# Patient Record
Sex: MALE | Race: WHITE | Employment: FULL TIME | URBAN - METROPOLITAN AREA
[De-identification: names, ages, dates, MRNs, and addresses within clinical notes are randomized per-mention and may not be internally consistent; named-entity substitution may affect disease eponyms.]

---

## 2017-03-28 ENCOUNTER — OFFICE VISIT (OUTPATIENT)
Dept: FAMILY MEDICINE CLINIC | Age: 47
End: 2017-03-28

## 2017-03-28 VITALS
DIASTOLIC BLOOD PRESSURE: 68 MMHG | WEIGHT: 154 LBS | HEIGHT: 69 IN | TEMPERATURE: 98.2 F | HEART RATE: 64 BPM | SYSTOLIC BLOOD PRESSURE: 140 MMHG | BODY MASS INDEX: 22.81 KG/M2 | RESPIRATION RATE: 16 BRPM

## 2017-03-28 DIAGNOSIS — Z13.220 SCREENING, LIPID: ICD-10-CM

## 2017-03-28 DIAGNOSIS — Z13.1 SCREENING FOR DIABETES MELLITUS: ICD-10-CM

## 2017-03-28 DIAGNOSIS — Z00.00 WELL ADULT EXAM: Primary | ICD-10-CM

## 2017-03-28 PROCEDURE — 99386 PREV VISIT NEW AGE 40-64: CPT | Performed by: FAMILY MEDICINE

## 2017-03-28 ASSESSMENT — ENCOUNTER SYMPTOMS
DIARRHEA: 0
CONSTIPATION: 0
CHEST TIGHTNESS: 0
SHORTNESS OF BREATH: 0
NAUSEA: 0
BLOOD IN STOOL: 0
ABDOMINAL PAIN: 0
VOMITING: 0
ANAL BLEEDING: 0

## 2017-03-30 LAB
ALBUMIN SERPL-MCNC: 4.7 G/DL (ref 3.2–5.3)
ALK PHOSPHATASE: 45 IU/L (ref 35–121)
ALT SERPL-CCNC: 16 IU/L (ref 5–59)
ANION GAP SERPL CALCULATED.3IONS-SCNC: 6 MMOL/L
AST SERPL-CCNC: 21 IU/L (ref 10–42)
BILIRUB SERPL-MCNC: 0.7 MG/DL (ref 0.2–1.3)
BUN BLDV-MCNC: 15 MG/DL (ref 10–20)
CALCIUM SERPL-MCNC: 9.5 MG/DL (ref 8.7–10.8)
CHLORIDE BLD-SCNC: 106 MMOL/L (ref 95–111)
CHOLESTEROL/HDL RATIO: 2.1
CHOLESTEROL: 115 MG/DL
CO2: 30 MMOL/L (ref 21–32)
CREAT SERPL-MCNC: 0.8 MG/DL (ref 0.5–1.3)
EGFR AFRICAN AMERICAN: 126
EGFR IF NONAFRICAN AMERICAN: 105
GLUCOSE: 86 MG/DL (ref 70–100)
HDLC SERPL-MCNC: 55 MG/DL (ref 40–60)
LDL CHOLESTEROL CALCULATED: 52 MG/DL
LDL/HDL RATIO: 0.9
POTASSIUM SERPL-SCNC: 4.2 MMOL/L (ref 3.5–5.4)
SODIUM BLD-SCNC: 138 MMOL/L (ref 134–147)
TOTAL PROTEIN: 7.1 G/DL (ref 5.8–8)
TRIGL SERPL-MCNC: 40 MG/DL
VLDLC SERPL CALC-MCNC: 8 MG/DL

## 2018-04-02 ENCOUNTER — TELEPHONE (OUTPATIENT)
Dept: FAMILY MEDICINE CLINIC | Age: 48
End: 2018-04-02

## 2018-04-03 ENCOUNTER — TELEPHONE (OUTPATIENT)
Dept: FAMILY MEDICINE CLINIC | Age: 48
End: 2018-04-03

## 2018-05-14 ENCOUNTER — OFFICE VISIT (OUTPATIENT)
Dept: FAMILY MEDICINE CLINIC | Age: 48
End: 2018-05-14
Payer: COMMERCIAL

## 2018-05-14 VITALS
HEIGHT: 69 IN | SYSTOLIC BLOOD PRESSURE: 136 MMHG | WEIGHT: 155.4 LBS | BODY MASS INDEX: 23.02 KG/M2 | HEART RATE: 64 BPM | DIASTOLIC BLOOD PRESSURE: 90 MMHG | TEMPERATURE: 98.6 F | RESPIRATION RATE: 12 BRPM

## 2018-05-14 DIAGNOSIS — Z00.00 LABORATORY EXAM ORDERED AS PART OF ROUTINE GENERAL MEDICAL EXAMINATION: ICD-10-CM

## 2018-05-14 DIAGNOSIS — Z00.00 WELL ADULT EXAM: Primary | ICD-10-CM

## 2018-05-14 DIAGNOSIS — L30.9 ECZEMA, UNSPECIFIED TYPE: ICD-10-CM

## 2018-05-14 DIAGNOSIS — I10 ESSENTIAL HYPERTENSION: ICD-10-CM

## 2018-05-14 DIAGNOSIS — J30.1 CHRONIC SEASONAL ALLERGIC RHINITIS DUE TO POLLEN: ICD-10-CM

## 2018-05-14 PROCEDURE — 99396 PREV VISIT EST AGE 40-64: CPT | Performed by: FAMILY MEDICINE

## 2018-05-14 RX ORDER — LORATADINE 10 MG/1
10 TABLET ORAL DAILY
COMMUNITY

## 2018-05-14 RX ORDER — LISINOPRIL 10 MG/1
10 TABLET ORAL DAILY
Qty: 30 TABLET | Refills: 1 | Status: SHIPPED | OUTPATIENT
Start: 2018-05-14 | End: 2018-06-08 | Stop reason: SDUPTHER

## 2018-05-14 ASSESSMENT — ENCOUNTER SYMPTOMS
VOMITING: 0
CONSTIPATION: 0
CHEST TIGHTNESS: 0
ANAL BLEEDING: 0
ABDOMINAL PAIN: 0
NAUSEA: 0
SHORTNESS OF BREATH: 0
DIARRHEA: 0
BLOOD IN STOOL: 0

## 2018-05-14 ASSESSMENT — PATIENT HEALTH QUESTIONNAIRE - PHQ9
SUM OF ALL RESPONSES TO PHQ QUESTIONS 1-9: 0
1. LITTLE INTEREST OR PLEASURE IN DOING THINGS: 0
SUM OF ALL RESPONSES TO PHQ9 QUESTIONS 1 & 2: 0
2. FEELING DOWN, DEPRESSED OR HOPELESS: 0

## 2018-05-31 LAB
ALBUMIN SERPL-MCNC: 4.6 G/DL (ref 3.5–5.2)
ALK PHOSPHATASE: 45 U/L (ref 39–118)
ALT SERPL-CCNC: 16 U/L (ref 5–50)
ANION GAP SERPL CALCULATED.3IONS-SCNC: 12 MEQ/L (ref 10–19)
AST SERPL-CCNC: 21 U/L (ref 9–50)
BILIRUB SERPL-MCNC: 0.8 MG/DL
BUN BLDV-MCNC: 14 MG/DL (ref 8–23)
CALCIUM SERPL-MCNC: 9 MG/DL (ref 8.5–10.5)
CHLORIDE BLD-SCNC: 102 MEQ/L (ref 95–107)
CHOLESTEROL/HDL RATIO: 2
CHOLESTEROL: 114 MG/DL
CO2: 27 MEQ/L (ref 19–31)
CREAT SERPL-MCNC: 0.9 MG/DL (ref 0.8–1.4)
EGFR AFRICAN AMERICAN: 117.5 ML/MIN/1.73 M2
EGFR IF NONAFRICAN AMERICAN: 101.4 ML/MIN/1.73 M2
GLUCOSE: 88 MG/DL (ref 70–99)
HDLC SERPL-MCNC: 56 MG/DL
LDL CHOLESTEROL CALCULATED: 48 MG/DL
LDL/HDL RATIO: 0.9
POTASSIUM SERPL-SCNC: 4.2 MEQ/L (ref 3.5–5.4)
SODIUM BLD-SCNC: 141 MEQ/L (ref 135–146)
T4 FREE: 1.3 NG/DL (ref 0.8–1.9)
TOTAL PROTEIN: 7.1 G/DL (ref 6.1–8.3)
TRIGL SERPL-MCNC: 48 MG/DL
TSH SERPL DL<=0.05 MIU/L-ACNC: 1.7 UIU/ML (ref 0.4–4.1)
VLDLC SERPL CALC-MCNC: 10 MG/DL

## 2018-06-07 ENCOUNTER — PATIENT MESSAGE (OUTPATIENT)
Dept: FAMILY MEDICINE CLINIC | Age: 48
End: 2018-06-07

## 2018-06-07 DIAGNOSIS — I10 ESSENTIAL HYPERTENSION: ICD-10-CM

## 2018-06-08 RX ORDER — LISINOPRIL 10 MG/1
10 TABLET ORAL DAILY
Qty: 90 TABLET | Refills: 3 | Status: SHIPPED | OUTPATIENT
Start: 2018-06-08 | End: 2019-05-17 | Stop reason: SDUPTHER

## 2018-06-28 ENCOUNTER — PATIENT MESSAGE (OUTPATIENT)
Dept: FAMILY MEDICINE CLINIC | Age: 48
End: 2018-06-28

## 2018-07-03 DIAGNOSIS — I10 ESSENTIAL HYPERTENSION: ICD-10-CM

## 2018-07-03 RX ORDER — LISINOPRIL 10 MG/1
TABLET ORAL
Qty: 30 TABLET | Refills: 1 | OUTPATIENT
Start: 2018-07-03

## 2019-05-17 ENCOUNTER — OFFICE VISIT (OUTPATIENT)
Dept: FAMILY MEDICINE CLINIC | Age: 49
End: 2019-05-17
Payer: COMMERCIAL

## 2019-05-17 VITALS
RESPIRATION RATE: 16 BRPM | HEIGHT: 69 IN | BODY MASS INDEX: 22.51 KG/M2 | WEIGHT: 152 LBS | TEMPERATURE: 98.1 F | SYSTOLIC BLOOD PRESSURE: 114 MMHG | HEART RATE: 68 BPM | DIASTOLIC BLOOD PRESSURE: 70 MMHG

## 2019-05-17 DIAGNOSIS — Z00.00 WELL ADULT EXAM: Primary | ICD-10-CM

## 2019-05-17 DIAGNOSIS — I10 ESSENTIAL HYPERTENSION: ICD-10-CM

## 2019-05-17 DIAGNOSIS — L30.9 ECZEMA, UNSPECIFIED TYPE: ICD-10-CM

## 2019-05-17 DIAGNOSIS — D17.1 LIPOMA OF SKIN OF ABDOMEN: ICD-10-CM

## 2019-05-17 DIAGNOSIS — Z00.00 LABORATORY EXAM ORDERED AS PART OF ROUTINE GENERAL MEDICAL EXAMINATION: ICD-10-CM

## 2019-05-17 DIAGNOSIS — J30.1 CHRONIC SEASONAL ALLERGIC RHINITIS DUE TO POLLEN: ICD-10-CM

## 2019-05-17 DIAGNOSIS — Z13.220 SCREENING, LIPID: ICD-10-CM

## 2019-05-17 DIAGNOSIS — Z13.1 SCREENING FOR DIABETES MELLITUS: ICD-10-CM

## 2019-05-17 PROCEDURE — 99396 PREV VISIT EST AGE 40-64: CPT | Performed by: FAMILY MEDICINE

## 2019-05-17 RX ORDER — LISINOPRIL 10 MG/1
10 TABLET ORAL DAILY
Qty: 90 TABLET | Refills: 3 | Status: SHIPPED | OUTPATIENT
Start: 2019-05-17 | End: 2020-05-08 | Stop reason: SDUPTHER

## 2019-05-17 ASSESSMENT — ENCOUNTER SYMPTOMS
VOMITING: 0
ANAL BLEEDING: 0
CHEST TIGHTNESS: 0
ABDOMINAL PAIN: 0
SHORTNESS OF BREATH: 0
NAUSEA: 0
DIARRHEA: 0
BLOOD IN STOOL: 0
CONSTIPATION: 0

## 2019-05-17 ASSESSMENT — PATIENT HEALTH QUESTIONNAIRE - PHQ9
1. LITTLE INTEREST OR PLEASURE IN DOING THINGS: 0
SUM OF ALL RESPONSES TO PHQ QUESTIONS 1-9: 0
2. FEELING DOWN, DEPRESSED OR HOPELESS: 0
SUM OF ALL RESPONSES TO PHQ QUESTIONS 1-9: 0
SUM OF ALL RESPONSES TO PHQ9 QUESTIONS 1 & 2: 0

## 2019-05-17 NOTE — PROGRESS NOTES
[facility]  [facility]  Dept: [phone]  Dept Fax: [phone]     HOPE Galvez is a 53 y.o.male    Pt presents for annual wellness physical exam.      Pt stable since last visit- no new problems, except pt complains of indistinct mass on left upper abdomen- noticed over past 12 months- no tenderness, no warmth, no redness, no bleeding, no drainage. Freely mobile. The home BP readings have been in the 120's / 70-80's range. 5/18/2018 5/29/2018 6/28/2018 6/28/2018 7/12/2018   Time 10:11 AM 3:14 PM 9:34 AM 9:38 AM 77:27 PM   Systolic 236 612 824 439 177   Diastolic 80 70 79 76 76   Pulse 60 67 59 66 68      12/28/2018 2/20/2019   Time 6:42 AM 4:37 AM   Systolic 178 715   Diastolic 82 88   Pulse  65     Patient Active Problem List   Diagnosis    Essential hypertension    Chronic seasonal allergic rhinitis due to pollen    Eczema       Review of Systems   Constitutional: Negative for chills, diaphoresis, fatigue, fever and unexpected weight change. Eyes: Negative for visual disturbance. Respiratory: Negative for chest tightness and shortness of breath. Cardiovascular: Negative for chest pain, palpitations and leg swelling. Gastrointestinal: Negative for abdominal pain, anal bleeding, blood in stool, constipation, diarrhea, nausea and vomiting. Genitourinary: Negative for dysuria and hematuria. Musculoskeletal: Negative for neck pain. Neurological: Negative for dizziness, light-headedness and headaches. OBJECTIVE     /70 (Site: Right Upper Arm)   Pulse 68   Temp 98.1 °F (36.7 °C) (Oral)   Resp 16   Ht 5' 9\" (1.753 m)   Wt 152 lb (68.9 kg)   BMI 22.45 kg/m²     Wt Readings from Last 3 Encounters:   05/17/19 152 lb (68.9 kg)   05/14/18 155 lb 6.4 oz (70.5 kg)   03/28/17 154 lb (69.9 kg)       Physical Exam   Constitutional: He is oriented to person, place, and time. He appears well-developed and well-nourished.    HENT: Head: Normocephalic and atraumatic. Right Ear: External ear normal.   Left Ear: External ear normal.   Nose: Nose normal.   Mouth/Throat: Oropharynx is clear and moist.   Eyes: Pupils are equal, round, and reactive to light. Conjunctivae and EOM are normal.   Neck: Normal range of motion. Neck supple. Cardiovascular: Normal rate, regular rhythm and intact distal pulses. Pulmonary/Chest: Effort normal and breath sounds normal.   Abdominal: Soft. Bowel sounds are normal.   Genitourinary:   Genitourinary Comments: GORDO deferred today as pt currently asymptomatic. Pt denies dysuria, hematuria, frequency, urgency, difficulty starting/ stopping stream, frequent nocturia    Will reconsider annually. ES     Musculoskeletal: Normal range of motion. Neurological: He is alert and oriented to person, place, and time. He has normal reflexes. Skin: Skin is warm and dry. 1 x 1 cm freely mobile indistinct bordered mass (lipomatous) on left upper abdomen. No bleeding, no drainage, no redness, no warmth. No worrisome characteristics. Psychiatric: He has a normal mood and affect. His behavior is normal. Judgment and thought content normal.   Nursing note and vitals reviewed.        Component      Latest Ref Rng & Units 5/30/2018 3/29/2017   Glucose      70 - 99 mg/dL 88 86   BUN      8 - 23 mg/dL 14 15   Creatinine      0.8 - 1.4 mg/dL 0.9 0.8   eGFR African American      >59 mL/min/1.73 m2 117.5 126   EGFR IF NonAfrican American      >59 mL/min/1.73 m2 101.4 105   Calcium      8.5 - 10.5 mg/dL 9.0 9.5   Sodium      135 - 146 mEq/L 141 138   Potassium      3.5 - 5.4 mEq/L 4.2 4.2   Chloride      95 - 107 mEq/L 102 106   CO2      19 - 31 mEq/L 27 30   Anion Gap      10 - 19 mEq/L 12 6   Bilirubin      <1.3 mg/dL 0.8 0.7   Alk Phosphatase      39 - 118 U/L 45 45   AST      9 - 50 U/L 21 21   ALT      5 - 50 U/L 16 16   Total Protein      6.1 - 8.3 g/dL 7.1 7.1   Albumin      3.5 - 5.2 g/dL 4.6 4.7   Cholesterol <200 mg/dL 114 115   Triglycerides      <150 mg/dL 48 40   HDL Cholesterol      >39 mg/dL 56 55   LDL Calculated      <130 mg/dL 48 52   VLDL Cholesterol Calculated      <30 mg/dL 10 8   LDl/HDL Ratio      <3.5 0.9 0.9   Chol/HDL Ratio      <5 2.0 2.1   TSH      0.4 - 4.1 uIU/mL 1.70    T4 Free      0.80 - 1.90 ng/dL 1.30      The ASCVD Risk score (Jean Marie Newman, et al., 2013) failed to calculate for the following reasons: The valid total cholesterol range is 130 to 320 mg/dL      Immunization History   Administered Date(s) Administered    Influenza Vaccine, unspecified formulation 11/10/2016    Influenza Virus Vaccine 10/01/2017, 10/31/2018    Tdap (Boostrix, Adacel) 05/14/2018         Health Maintenance   Topic Date Due    Potassium monitoring  05/30/2019    Creatinine monitoring  05/30/2019    Lipid screen  05/30/2023    DTaP/Tdap/Td vaccine (2 - Td) 05/14/2028    Flu vaccine  Completed    Pneumococcal 0-64 years Vaccine  Aged Out    HIV screen  Discontinued     AAA ultrasound (Male, 65-75, smoked ever) indicated at this time? No tobacco history  CT Lung Screen (55-80, 30 pk-yrs, smoking or quit <15 years) indicated at this time? No tobacco history    No future appointments. ASSESSMENT       Diagnosis Orders   1. Well adult exam     2. Essential hypertension  lisinopril (PRINIVIL;ZESTRIL) 10 MG tablet    Comprehensive Metabolic Panel    Lipid Panel   3. Eczema, unspecified type     4. Chronic seasonal allergic rhinitis due to pollen     5. Lipoma of skin of abdomen     6. Laboratory exam ordered as part of routine general medical examination  Comprehensive Metabolic Panel    Lipid Panel   7. Screening, lipid  Lipid Panel   8. Screening for diabetes mellitus  Comprehensive Metabolic Panel     PLAN      Continue Lisinopril 10 mg- 1 pill daily. #90/3 refills  Home BP's- call if > 140/90 on a regular basis. Pt declines HIV screening at this time due to lack of risk factors.  (updated

## 2019-06-08 LAB
ALBUMIN SERPL-MCNC: 4.4 G/DL (ref 3.2–5.3)
ALK PHOSPHATASE: 39 U/L (ref 39–130)
ALT SERPL-CCNC: 16 U/L (ref 0–40)
ANION GAP SERPL CALCULATED.3IONS-SCNC: 8 MMOL/L (ref 4–12)
AST SERPL-CCNC: 20 U/L (ref 0–41)
BILIRUB SERPL-MCNC: 0.9 MG/DL (ref 0.3–1.2)
BUN BLDV-MCNC: 13 MG/DL (ref 5–23)
CALCIUM SERPL-MCNC: 9 MG/DL (ref 8.5–10.5)
CHLORIDE BLD-SCNC: 108 MMOL/L (ref 98–109)
CHOLESTEROL/HDL RATIO: 2.1 (ref 1–5)
CHOLESTEROL: 114 MG/DL (ref 150–200)
CO2: 27 MMOL/L (ref 22–32)
CREAT SERPL-MCNC: 0.82 MG/DL (ref 0.6–1.3)
EGFR AFRICAN AMERICAN: >60 ML/MIN/1.73SQ.M
EGFR IF NONAFRICAN AMERICAN: >60 ML/MIN/1.73SQ.M
GLUCOSE: 92 MG/DL (ref 65–99)
HDLC SERPL-MCNC: 55 MG/DL
LDL CHOLESTEROL CALCULATED: 49 MG/DL
LDL/HDL RATIO: 0.9
POTASSIUM SERPL-SCNC: 4.2 MMOL/L (ref 3.5–5)
SODIUM BLD-SCNC: 143 MMOL/L (ref 134–146)
TOTAL PROTEIN: 6.7 G/DL (ref 6–8)
TRIGL SERPL-MCNC: 48 MG/DL (ref 27–150)
VLDLC SERPL CALC-MCNC: 10 MG/DL (ref 0–30)

## 2020-05-06 ENCOUNTER — TELEPHONE (OUTPATIENT)
Dept: ADMINISTRATIVE | Age: 50
End: 2020-05-06

## 2020-05-06 NOTE — TELEPHONE ENCOUNTER
Pt called and need to R/S his physical.  Wanted to try and do it on the 27th. I do not see availability until Sept. Took off schedule for 5/22. Can you call him if he could come in the 27th or next available. His concern was being able to get scripts if needed.  (He would do VV but could not since it's a physical.)1st available 9/1

## 2020-05-08 ENCOUNTER — TELEPHONE (OUTPATIENT)
Dept: FAMILY MEDICINE CLINIC | Age: 50
End: 2020-05-08

## 2020-05-08 RX ORDER — LISINOPRIL 10 MG/1
10 TABLET ORAL DAILY
Qty: 90 TABLET | Refills: 1 | Status: SHIPPED | OUTPATIENT
Start: 2020-05-08 | End: 2020-09-22 | Stop reason: SDUPTHER

## 2020-09-11 LAB
ALBUMIN SERPL-MCNC: 4.7 G/DL (ref 3.2–5.3)
ALK PHOSPHATASE: 38 U/L (ref 39–130)
ALT SERPL-CCNC: 17 U/L (ref 0–40)
ANION GAP SERPL CALCULATED.3IONS-SCNC: 9 MMOL/L (ref 5–15)
AST SERPL-CCNC: 20 U/L (ref 0–41)
BILIRUB SERPL-MCNC: 0.6 MG/DL (ref 0.3–1.2)
BUN BLDV-MCNC: 19 MG/DL (ref 5–23)
CALCIUM SERPL-MCNC: 9.6 MG/DL (ref 8.5–10.5)
CHLORIDE BLD-SCNC: 105 MMOL/L (ref 98–109)
CHOLESTEROL/HDL RATIO: 2.1 (ref 1–5)
CHOLESTEROL: 130 MG/DL (ref 150–200)
CO2: 26 MMOL/L (ref 22–32)
CREAT SERPL-MCNC: 0.84 MG/DL (ref 0.6–1.3)
EGFR AFRICAN AMERICAN: >60 ML/MIN/1.73SQ.M
EGFR IF NONAFRICAN AMERICAN: >60 ML/MIN/1.73SQ.M
GLUCOSE: 97 MG/DL (ref 65–99)
HDLC SERPL-MCNC: 63 MG/DL
LDL CHOLESTEROL CALCULATED: 59 MG/DL
LDL/HDL RATIO: 0.9
POTASSIUM SERPL-SCNC: 4.4 MMOL/L (ref 3.5–5)
SODIUM BLD-SCNC: 140 MMOL/L (ref 134–146)
TOTAL PROTEIN: 7.5 G/DL (ref 6–8)
TRIGL SERPL-MCNC: 38 MG/DL (ref 27–150)
VLDLC SERPL CALC-MCNC: 8 MG/DL (ref 0–30)

## 2020-09-20 NOTE — PROGRESS NOTES
[facility]  [facility]  Dept: [phone]  Dept Fax: [phone]  HOPE Watkins is a 53 y.o.male    Pt presents for annual wellness physical exam.      Pt also presents for follow up of HTN, Allergies, and Eczema. The home BP readings have been in the 120's / 70-80's range. Checking occasionally. Pt doing well at this time- denies any new complaints. Wt Readings from Last 3 Encounters:   09/22/20 156 lb 3.2 oz (70.9 kg)   05/17/19 152 lb (68.9 kg)   05/14/18 155 lb 6.4 oz (70.5 kg)   Weight increased 4# since last visit 16 months ago. Pt recently ran marathon with daughter (completed in 4 hours in 18 minutes). Pt stable since last visit- no new problems for diagnoses listed below:  Patient Active Problem List   Diagnosis    Essential hypertension    Chronic seasonal allergic rhinitis due to pollen    Eczema     Review of Systems   Constitutional: Negative for chills, diaphoresis, fatigue, fever and unexpected weight change. Eyes: Negative for visual disturbance. Respiratory: Negative for chest tightness and shortness of breath. Cardiovascular: Negative for chest pain, palpitations and leg swelling. Gastrointestinal: Negative for abdominal pain, anal bleeding, blood in stool, constipation, diarrhea, nausea and vomiting. Genitourinary: Negative for dysuria and hematuria. Musculoskeletal: Negative for neck pain. Neurological: Negative for dizziness, light-headedness and headaches. OBJECTIVE     /78 (Site: Left Upper Arm)   Pulse 58   Temp 98.6 °F (37 °C)   Resp 16   Ht 5' 9\" (1.753 m)   Wt 156 lb 3.2 oz (70.9 kg)   BMI 23.07 kg/m²     Wt Readings from Last 3 Encounters:   09/22/20 156 lb 3.2 oz (70.9 kg)   05/17/19 152 lb (68.9 kg)   05/14/18 155 lb 6.4 oz (70.5 kg)     Physical Exam  Vitals signs and nursing note reviewed. Exam conducted with a chaperone present.    Constitutional:       General: He is not in acute distress. Appearance: Normal appearance. He is not ill-appearing, toxic-appearing or diaphoretic. HENT:      Head: Normocephalic and atraumatic. Right Ear: External ear normal.      Left Ear: External ear normal.      Nose: Nose normal. No rhinorrhea. Mouth/Throat:      Mouth: Mucous membranes are moist.      Pharynx: Oropharynx is clear. Eyes:      General: No scleral icterus. Right eye: No discharge. Left eye: No discharge. Extraocular Movements: Extraocular movements intact. Conjunctiva/sclera: Conjunctivae normal.      Pupils: Pupils are equal, round, and reactive to light. Neck:      Musculoskeletal: Normal range of motion. Cardiovascular:      Rate and Rhythm: Normal rate and regular rhythm. Heart sounds: Normal heart sounds. No murmur. No friction rub. No gallop. Pulmonary:      Effort: Pulmonary effort is normal. No respiratory distress. Breath sounds: Normal breath sounds. No stridor. No wheezing, rhonchi or rales. Chest:      Chest wall: No tenderness. Abdominal:      General: Abdomen is flat. Bowel sounds are normal. There is no distension. Palpations: Abdomen is soft. There is no mass. Tenderness: There is no abdominal tenderness. There is no guarding or rebound. Hernia: No hernia is present. Genitourinary:     Comments: GORDO deferred today as pt currently asymptomatic. Pt denies dysuria, hematuria, frequency, urgency, difficulty starting/ stopping stream, frequent nocturia    Will reconsider annually. ES    Musculoskeletal: Normal range of motion. General: No swelling, deformity or signs of injury. Skin:     General: Skin is warm and dry. Coloration: Skin is not jaundiced or pale. Findings: No bruising, erythema, lesion or rash. Neurological:      General: No focal deficit present. Mental Status: He is alert and oriented to person, place, and time. Mental status is at baseline.       Motor: No weakness. Coordination: Coordination normal.      Gait: Gait normal.   Psychiatric:         Mood and Affect: Mood normal.         Behavior: Behavior normal.         Thought Content:  Thought content normal.         Judgment: Judgment normal.       Component      Latest Ref Rng & Units 9/10/2020   Glucose      65 - 99 mg/dL 97   BUN      5 - 23 mg/dL 19   Creatinine      0.60 - 1.30 mg/dL 0.84   eGFR African American      >59 ml/min/1.73sq.m >60   EGFR IF NonAfrican American      >59 ml/min/1.73sq.m >60   Calcium      8.5 - 10.5 mg/dL 9.6   Sodium      134 - 146 mmol/L 140   Potassium      3.5 - 5.0 mmol/L 4.4   Chloride      98 - 109 mmol/L 105   CO2      22 - 32 mmol/L 26   Anion Gap      5 - 15 mmol/L 9   Bilirubin      0.3 - 1.2 mg/dL 0.6   Alk Phosphatase      39 - 130 U/L 38 (L)   AST      0 - 41 U/L 20   ALT      0 - 40 U/L 17   Total Protein      6.0 - 8.0 g/dL 7.5   Albumin      3.2 - 5.3 g/dL 4.7   Cholesterol      150 - 200 mg/dL 130 (L)   Triglycerides      27 - 150 mg/dL 38   HDL Cholesterol      >39 mg/dL 63   LDL Calculated      <130 mg/dL 59   VLDL Cholesterol Calculated      0 - 30 mg/dL 8   LDl/HDL Ratio      <3.5 0.9   Chol/HDL Ratio      1.0 - 5.0 2.1     The 10-year ASCVD risk score (Angela Myrick, et al., 2013) is: 1.3%    Values used to calculate the score:      Age: 53 years      Sex: Male      Is Non- : No      Diabetic: No      Tobacco smoker: No      Systolic Blood Pressure: 295 mmHg      Is BP treated: Yes      HDL Cholesterol: 63 mg/dL      Total Cholesterol: 130 mg/dL      Lab Results   Component Value Date    TSH 1.70 05/30/2018    T4FREE 1.30 05/30/2018     No results found for: WBC, HGB, HCT, MCV, PLT    Immunization History   Administered Date(s) Administered    Influenza Vaccine, unspecified formulation 11/10/2016    Influenza Virus Vaccine 10/01/2017, 10/31/2018    Influenza, MDCK Quadv, IM, PF (Flucelvax 4 yrs and older) 10/15/2019    Influenza, Quadv, IM, PF (6 mo and older Fluzone, Flulaval, Fluarix, and 3 yrs and older Afluria) 10/31/2018    Tdap (Boostrix, Adacel) 05/14/2018     Health Maintenance   Topic Date Due    Flu vaccine (1) 09/01/2020    Potassium monitoring  09/10/2021    Creatinine monitoring  09/10/2021    Lipid screen  09/10/2025    DTaP/Tdap/Td vaccine (2 - Td) 05/14/2028    Hepatitis A vaccine  Aged Out    Hepatitis B vaccine  Aged Out    Hib vaccine  Aged Out    Meningococcal (ACWY) vaccine  Aged Out    Pneumococcal 0-64 years Vaccine  Aged Out    HIV screen  Discontinued       AAA ultrasound (Male, 65-75, smoked ever) indicated at this time? No tobacco history  CT Lung Screen (55-80, 30 pk-yrs, smoking or quit <15 years) indicated at this time? No tobacco history    No future appointments. ASSESSMENT       Diagnosis Orders   1. Well adult exam     2. Essential hypertension  lisinopril (PRINIVIL;ZESTRIL) 10 MG tablet    Comprehensive Metabolic Panel    Lipid Panel   3. Eczema, unspecified type     4. Chronic seasonal allergic rhinitis due to pollen     5. Laboratory exam ordered as part of routine general medical examination  Comprehensive Metabolic Panel    Lipid Panel    Psa screening   6. Lipid screening  Lipid Panel   7. Screening for diabetes mellitus  Comprehensive Metabolic Panel   8. Screening PSA (prostate specific antigen)  Psa screening     PLAN      Encouraged annual FLU VACCINE after October 1st.    Continue Lisinopril 10 mg- 1 pill daily.  #90/3 refills  Home BP's- call if > 140/90 on a regular basis. Continue to work on diet & exercise for optimal cardiovascular health.   Plan FLP, CMP, and PSA in 12 months.    Continue current meds  Refills    Follow up in 12 months.      Electronically signed Montserrat Patel MD on 9/22/2020 at 1:14 PM

## 2020-09-22 ENCOUNTER — OFFICE VISIT (OUTPATIENT)
Dept: FAMILY MEDICINE CLINIC | Age: 50
End: 2020-09-22
Payer: COMMERCIAL

## 2020-09-22 VITALS
RESPIRATION RATE: 16 BRPM | TEMPERATURE: 98.6 F | SYSTOLIC BLOOD PRESSURE: 118 MMHG | DIASTOLIC BLOOD PRESSURE: 78 MMHG | HEIGHT: 69 IN | WEIGHT: 156.2 LBS | BODY MASS INDEX: 23.13 KG/M2 | HEART RATE: 58 BPM

## 2020-09-22 PROCEDURE — 99396 PREV VISIT EST AGE 40-64: CPT | Performed by: FAMILY MEDICINE

## 2020-09-22 RX ORDER — LISINOPRIL 10 MG/1
10 TABLET ORAL DAILY
Qty: 90 TABLET | Refills: 3 | Status: SHIPPED | OUTPATIENT
Start: 2020-09-22 | End: 2021-09-23 | Stop reason: SDUPTHER

## 2020-09-22 ASSESSMENT — ENCOUNTER SYMPTOMS
ABDOMINAL PAIN: 0
NAUSEA: 0
SHORTNESS OF BREATH: 0
ANAL BLEEDING: 0
DIARRHEA: 0
BLOOD IN STOOL: 0
VOMITING: 0
CHEST TIGHTNESS: 0
CONSTIPATION: 0

## 2020-09-22 ASSESSMENT — PATIENT HEALTH QUESTIONNAIRE - PHQ9
1. LITTLE INTEREST OR PLEASURE IN DOING THINGS: 0
2. FEELING DOWN, DEPRESSED OR HOPELESS: 0
SUM OF ALL RESPONSES TO PHQ QUESTIONS 1-9: 0
SUM OF ALL RESPONSES TO PHQ9 QUESTIONS 1 & 2: 0
SUM OF ALL RESPONSES TO PHQ QUESTIONS 1-9: 0

## 2020-09-22 NOTE — PATIENT INSTRUCTIONS
Encouraged annual FLU VACCINE after October 1st.    Continue Lisinopril 10 mg- 1 pill daily.  #90/3 refills  Home BP's- call if > 140/90 on a regular basis. Continue to work on diet & exercise for optimal cardiovascular health.   Plan FLP, CMP, and PSA in 12 months.    Continue current meds  Refills    Follow up in 12 months.

## 2021-04-14 ENCOUNTER — HOSPITAL ENCOUNTER (OUTPATIENT)
Age: 51
Setting detail: OUTPATIENT SURGERY
Discharge: HOME | End: 2021-04-14
Attending: SPECIALIST | Admitting: SPECIALIST
Payer: COMMERCIAL

## 2021-04-14 VITALS
BODY MASS INDEX: 22.51 KG/M2 | SYSTOLIC BLOOD PRESSURE: 134 MMHG | HEIGHT: 69 IN | HEART RATE: 87 BPM | OXYGEN SATURATION: 95 % | DIASTOLIC BLOOD PRESSURE: 81 MMHG | TEMPERATURE: 98.8 F | WEIGHT: 152 LBS | RESPIRATION RATE: 12 BRPM

## 2021-04-14 PROCEDURE — 7100000011 HC PHASE II RECOVERY - ADDTL 15 MIN: Performed by: SPECIALIST

## 2021-04-14 PROCEDURE — 7100000010 HC PHASE II RECOVERY - FIRST 15 MIN: Performed by: SPECIALIST

## 2021-04-14 PROCEDURE — 3600000002 HC SURGERY LEVEL 2 BASE: Performed by: SPECIALIST

## 2021-04-14 PROCEDURE — 2709999900 HC NON-CHARGEABLE SUPPLY: Performed by: SPECIALIST

## 2021-04-14 PROCEDURE — 6370000000 HC RX 637 (ALT 250 FOR IP): Performed by: PHYSICIAN ASSISTANT

## 2021-04-14 PROCEDURE — 88304 TISSUE EXAM BY PATHOLOGIST: CPT

## 2021-04-14 PROCEDURE — 3600000012 HC SURGERY LEVEL 2 ADDTL 15MIN: Performed by: SPECIALIST

## 2021-04-14 PROCEDURE — 2500000003 HC RX 250 WO HCPCS: Performed by: SPECIALIST

## 2021-04-14 RX ORDER — CEPHALEXIN 500 MG/1
500 CAPSULE ORAL ONCE
Status: COMPLETED | OUTPATIENT
Start: 2021-04-14 | End: 2021-04-14

## 2021-04-14 RX ORDER — LIDOCAINE HYDROCHLORIDE AND EPINEPHRINE 10; 10 MG/ML; UG/ML
INJECTION, SOLUTION INFILTRATION; PERINEURAL PRN
Status: DISCONTINUED | OUTPATIENT
Start: 2021-04-14 | End: 2021-04-14 | Stop reason: ALTCHOICE

## 2021-04-14 RX ADMIN — CEPHALEXIN 500 MG: 500 CAPSULE ORAL at 09:11

## 2021-04-14 ASSESSMENT — PAIN - FUNCTIONAL ASSESSMENT: PAIN_FUNCTIONAL_ASSESSMENT: 0-10

## 2021-04-14 ASSESSMENT — PAIN SCALES - GENERAL: PAINLEVEL_OUTOF10: 0

## 2021-04-14 NOTE — OP NOTE
Operative Note    Patient name: Page Childs             Medical Record Number: [id number]    Primary Care Physician: Ryan Zapata MD     1970    Date of Procedure: 2021    Pre-operative Diagnosis: 2cm subcutaneous left upper abdominal painful lipoma    Post-operative Diagnosis: Same    Procedure Performed: Excision of 2cm subcutaneous left upper abdominal painful lipoma      Surgeons/Assistants: Dr. Ambar Noland PA-C    Estimated Blood Loss: 2ml     Complications: none immediately appreciated    Procedure: With the patient lying in the supine position after having anesthetized the area with a total of 11 ml of 1% Lidocaine 1:100,000 with epinephrine solution, the area was then prepped  draped in the standard surgical fashion. Incision was made and dissection found the subcutaneous fatty tumor that was completely removed. This was sent for permanent pathologic evaluation. Hemostasis was assured and this was then closed in layers with 4-0 Monocryl suture placed in interrupted buried fashion. Final dressings was benzoin/steristrips. The patient tolerated the procedure quite well and remained hemodynamically stable throughout the procedure and was quite comfortable throughout the operative course.     Clinical staging for cancer cases:  Ct  Cn  Cm    Juan M Simon  Electronically signed by me on 2021 at 10:17 AMOperative Note      Patient: Page Childs  YOB: 1970  MRN: [id number]    Date of Procedure: 2021    Pre-Op Diagnosis: LIPOMA LEFT ABDOMEN    Post-Op Diagnosis: Same       Procedure(s):  EXCISIOBN LIPOMA LEFT ABDOMEN    Surgeon(s):  Juan M Simon MD    Assistant:   Physician Assistant: Sparkle Arana PA-C    Anesthesia: Local    Estimated Blood Loss (mL): Minimal    Complications: None    Specimens:   ID Type Source Tests Collected by Time Destination   A :  Tissue Abdomen SURGICAL PATHOLOGY Juan M Simon MD 2021 2646 Implants:  * No implants in log *      Drains: * No LDAs found *    Findings: 2cm subcutaneous left upper abdominal painful lipoma    Detailed Description of Procedure:   Excision of 2cm subcutaneous left upper abdominal painful lipoma    Electronically signed by Pete Fallon MD on 4/14/2021 at 10:17 AM

## 2021-04-14 NOTE — PROGRESS NOTES
1014-  Patient arrived to phases II via chair. Spontaneous respirations even and unlabored. Placed on monitor--VSS. Report received from [facility]. 1015-  Assessment completed. Patient is alert and oriented x4. Patient denies pain--will monitor. See incision charting. 1020-  Patient denies snack or drink. Family will need to be called. 1-  All belongings brought to patient. 1035-  Patient dressed. 1045-  Discharge instructions given. Understanding verbalized. 1053-  Patient discharged in stable condition with all belongings.

## 2021-04-16 ENCOUNTER — TELEPHONE (OUTPATIENT)
Dept: FAMILY MEDICINE CLINIC | Age: 51
End: 2021-04-16

## 2021-09-09 LAB
ALBUMIN SERPL-MCNC: 4.4 G/DL (ref 3.2–5.3)
ALK PHOSPHATASE: 47 U/L (ref 39–130)
ALT SERPL-CCNC: 17 U/L (ref 0–40)
ANION GAP SERPL CALCULATED.3IONS-SCNC: 8 MMOL/L (ref 5–15)
AST SERPL-CCNC: 21 U/L (ref 0–41)
BILIRUB SERPL-MCNC: 0.7 MG/DL (ref 0.3–1.2)
BUN BLDV-MCNC: 17 MG/DL (ref 5–23)
CALCIUM SERPL-MCNC: 8.8 MG/DL (ref 8.5–10.5)
CHLORIDE BLD-SCNC: 108 MMOL/L (ref 98–109)
CHOLESTEROL/HDL RATIO: 2.1 (ref 1–5)
CHOLESTEROL: 119 MG/DL (ref 150–200)
CO2: 27 MMOL/L (ref 22–32)
CREAT SERPL-MCNC: 0.89 MG/DL (ref 0.6–1.3)
EGFR AFRICAN AMERICAN: >60 ML/MIN/1.73SQ.M
EGFR IF NONAFRICAN AMERICAN: >60 ML/MIN/1.73SQ.M
GLUCOSE: 93 MG/DL (ref 65–99)
HDLC SERPL-MCNC: 57 MG/DL
LDL CHOLESTEROL CALCULATED: 54 MG/DL
LDL/HDL RATIO: 0.9
POTASSIUM SERPL-SCNC: 4.3 MMOL/L (ref 3.5–5)
PSA, ULTRASENSITIVE: 0.95 NG/ML (ref 0–4)
SODIUM BLD-SCNC: 143 MMOL/L (ref 134–146)
TOTAL PROTEIN: 6.8 G/DL (ref 6–8)
TRIGL SERPL-MCNC: 40 MG/DL (ref 27–150)
VLDLC SERPL CALC-MCNC: 8 MG/DL (ref 0–30)

## 2021-09-18 NOTE — PROGRESS NOTES
[facility]  [facility]  Dept: [phone]  Dept Fax: [phone]    HOPE Park is a 49 y.o.male    Pt presents for annual wellness physical exam.      Patient also presents for follow-up of hypertension, allergic rhinitis, and eczema    The home BP readings have not been checked regularly. Pt doing well at this time- denies any new complaints. Wt Readings from Last 3 Encounters:   09/23/21 157 lb (71.2 kg)   04/14/21 152 lb (68.9 kg)   09/22/20 156 lb 3.2 oz (70.9 kg)   Weight increased 1# since last visit 12 months ago. Pt stable since last visit- no new problems for diagnoses listed below:  Patient Active Problem List   Diagnosis    Essential hypertension    Chronic seasonal allergic rhinitis due to pollen    Eczema     Review of Systems   Constitutional: Negative for chills, diaphoresis, fatigue, fever and unexpected weight change. Eyes: Negative for visual disturbance. Respiratory: Negative for chest tightness and shortness of breath. Cardiovascular: Negative for chest pain, palpitations and leg swelling. Gastrointestinal: Negative for abdominal pain, anal bleeding, blood in stool, constipation, diarrhea, nausea and vomiting. Genitourinary: Negative for dysuria and hematuria. Musculoskeletal: Negative for neck pain. Neurological: Negative for dizziness, light-headedness and headaches. OBJECTIVE     /74   Pulse 64   Resp 16   Ht 5' 9\" (1.753 m)   Wt 157 lb (71.2 kg)   BMI 23.18 kg/m²     Wt Readings from Last 3 Encounters:   09/23/21 157 lb (71.2 kg)   04/14/21 152 lb (68.9 kg)   09/22/20 156 lb 3.2 oz (70.9 kg)       Physical Exam  Vitals and nursing note reviewed. Constitutional:       Appearance: He is well-developed. HENT:      Head: Normocephalic and atraumatic.       Right Ear: External ear normal.      Left Ear: External ear normal.      Nose: Nose normal.   Eyes:      Conjunctiva/sclera: Conjunctivae normal.      Pupils: Pupils are equal, round, and reactive to light. Cardiovascular:      Rate and Rhythm: Normal rate and regular rhythm. Pulmonary:      Effort: Pulmonary effort is normal.      Breath sounds: Normal breath sounds. Abdominal:      General: Bowel sounds are normal.      Palpations: Abdomen is soft. Genitourinary:     Comments: GORDO deferred today as pt currently asymptomatic. Pt denies dysuria, hematuria, frequency, urgency, difficulty starting/ stopping stream, frequent nocturia    Will reconsider annually. ES    Musculoskeletal:         General: Normal range of motion. Cervical back: Normal range of motion and neck supple. Skin:     General: Skin is warm and dry. Neurological:      Mental Status: He is alert and oriented to person, place, and time. Deep Tendon Reflexes: Reflexes are normal and symmetric. Psychiatric:         Behavior: Behavior normal.         Thought Content:  Thought content normal.         Judgment: Judgment normal.         Component      Latest Ref Rng & Units 9/8/2021   Glucose      65 - 99 mg/dL 93   BUN      5 - 23 mg/dL 17   Creatinine      0.60 - 1.30 mg/dL 0.89   eGFR African American      >59 ml/min/1.73sq.m >60   EGFR IF NonAfrican American      >59 ml/min/1.73sq.m >60   Calcium      8.5 - 10.5 mg/dL 8.8   Sodium      134 - 146 mmol/L 143   Potassium      3.5 - 5.0 mmol/L 4.3   Chloride      98 - 109 mmol/L 108   CO2      22 - 32 mmol/L 27   Anion Gap      5 - 15 mmol/L 8   Bilirubin      0.3 - 1.2 mg/dL 0.7   Alk Phosphatase      39 - 130 U/L 47   AST      0 - 41 U/L 21   ALT      0 - 40 U/L 17   Total Protein      6.0 - 8.0 g/dL 6.8   Albumin      3.2 - 5.3 g/dL 4.4   Cholesterol      150 - 200 mg/dL 119 (L)   Triglycerides      27 - 150 mg/dL 40   HDL Cholesterol      >39 mg/dL 57   LDL Calculated      <130 mg/dL 54   VLDL Cholesterol Calculated      0 - 30 mg/dL 8   LDl/HDL Ratio      <3.5 0.9   Chol/HDL Ratio      1.0 - 5.0 2.1   PSA, Screen (50-80, 20 pk-yrs, smoking or quit <15 years) indicated at this time? No tobacco history  Sleep Medicine referral indicated at this time (Obesity, Snoring, Daytime Somnolence, Apneic Episodes)? Patient denies any current complaints    No future appointments. ASSESSMENT       Diagnosis Orders   1. Well adult exam     2. Essential hypertension  Lipid Panel    Comprehensive Metabolic Panel    T4, Free    TSH without Reflex    CBC Auto Differential    lisinopril (PRINIVIL;ZESTRIL) 10 MG tablet   3. Chronic seasonal allergic rhinitis due to pollen     4. Eczema, unspecified type  CBC Auto Differential   5. Laboratory exam ordered as part of routine general medical examination  Lipid Panel    Comprehensive Metabolic Panel    T4, Free    TSH without Reflex    CBC Auto Differential    PSA screening   6. Lipid screening  Lipid Panel   7. Screening for diabetes mellitus  Comprehensive Metabolic Panel   8. Screening PSA (prostate specific antigen)  PSA screening       PLAN     Continue Lisinopril 10 mg- 1 pill daily.    Home BP's- call if > 140/90 on a regular basis.   Continue to work on diet & exercise for optimal cardiovascular health.   Plan FLP, CMP, free T4/TSH, CBC, and PSA in 12 months.    Continue current meds  Refills    Follow up in 12 months. Preventive Health Topics:  Pt declines HEP C screening at this time due to lack of risk factors. Encouraged COVID VACCINE booster when available. Encouraged annual FLU VACCINE after October 1st.  Encouraged SHINGLES SHOT [facility]) - check with insurance re coverage and location of administration. Encouraged screening COLONOSCOPY-card given.        Electronically signed Dioni Cooper MD on 9/23/2021 at 8:46 AM

## 2021-09-23 ENCOUNTER — OFFICE VISIT (OUTPATIENT)
Dept: FAMILY MEDICINE CLINIC | Age: 51
End: 2021-09-23
Payer: COMMERCIAL

## 2021-09-23 VITALS
SYSTOLIC BLOOD PRESSURE: 120 MMHG | HEIGHT: 69 IN | WEIGHT: 157 LBS | DIASTOLIC BLOOD PRESSURE: 74 MMHG | HEART RATE: 64 BPM | BODY MASS INDEX: 23.25 KG/M2 | RESPIRATION RATE: 16 BRPM

## 2021-09-23 DIAGNOSIS — Z00.00 WELL ADULT EXAM: Primary | ICD-10-CM

## 2021-09-23 DIAGNOSIS — Z13.220 LIPID SCREENING: ICD-10-CM

## 2021-09-23 DIAGNOSIS — Z12.5 SCREENING PSA (PROSTATE SPECIFIC ANTIGEN): ICD-10-CM

## 2021-09-23 DIAGNOSIS — Z13.1 SCREENING FOR DIABETES MELLITUS: ICD-10-CM

## 2021-09-23 DIAGNOSIS — I10 ESSENTIAL HYPERTENSION: ICD-10-CM

## 2021-09-23 DIAGNOSIS — Z00.00 LABORATORY EXAM ORDERED AS PART OF ROUTINE GENERAL MEDICAL EXAMINATION: ICD-10-CM

## 2021-09-23 DIAGNOSIS — L30.9 ECZEMA, UNSPECIFIED TYPE: ICD-10-CM

## 2021-09-23 DIAGNOSIS — J30.1 CHRONIC SEASONAL ALLERGIC RHINITIS DUE TO POLLEN: ICD-10-CM

## 2021-09-23 PROCEDURE — 99396 PREV VISIT EST AGE 40-64: CPT | Performed by: FAMILY MEDICINE

## 2021-09-23 RX ORDER — LISINOPRIL 10 MG/1
10 TABLET ORAL DAILY
Qty: 90 TABLET | Refills: 3 | Status: SHIPPED | OUTPATIENT
Start: 2021-09-23 | End: 2022-09-26 | Stop reason: SDUPTHER

## 2021-09-23 ASSESSMENT — ENCOUNTER SYMPTOMS
ABDOMINAL PAIN: 0
BLOOD IN STOOL: 0
CHEST TIGHTNESS: 0
CONSTIPATION: 0
ANAL BLEEDING: 0
NAUSEA: 0
SHORTNESS OF BREATH: 0
VOMITING: 0
DIARRHEA: 0

## 2021-09-23 ASSESSMENT — PATIENT HEALTH QUESTIONNAIRE - PHQ9
SUM OF ALL RESPONSES TO PHQ9 QUESTIONS 1 & 2: 0
SUM OF ALL RESPONSES TO PHQ QUESTIONS 1-9: 0
SUM OF ALL RESPONSES TO PHQ QUESTIONS 1-9: 0
1. LITTLE INTEREST OR PLEASURE IN DOING THINGS: 0
SUM OF ALL RESPONSES TO PHQ QUESTIONS 1-9: 0
2. FEELING DOWN, DEPRESSED OR HOPELESS: 0

## 2021-09-23 NOTE — PATIENT INSTRUCTIONS
Continue Lisinopril 10 mg- 1 pill daily.    Home BP's- call if > 140/90 on a regular basis.   Continue to work on diet & exercise for optimal cardiovascular health.   Plan FLP, CMP, free T4/TSH, CBC, and PSA in 12 months.    Continue current meds  Refills    Follow up in 12 months. Preventive Health Topics:  Pt declines HEP C screening at this time due to lack of risk factors. Encouraged COVID VACCINE booster when available. Encouraged annual FLU VACCINE after October 1st.  Encouraged SHINGLES SHOT [facility]) - check with insurance re coverage and location of administration. Encouraged screening COLONOSCOPY-card given.

## 2022-09-13 LAB
ABSOLUTE BASO #: 0.04 K/UL (ref 0–0.2)
ABSOLUTE EOS #: 0.17 K/UL (ref 0–0.5)
ABSOLUTE LYMPH #: 1.84 K/UL (ref 1–4)
ABSOLUTE MONO #: 0.66 K/UL (ref 0.2–1)
ABSOLUTE NEUT #: 3.02 K/UL (ref 1.5–7.5)
BASOPHILS RELATIVE PERCENT: 0.7 %
EOSINOPHILS RELATIVE PERCENT: 3 %
HCT VFR BLD CALC: 43.8 % (ref 40–51)
HEMOGLOBIN: 14.5 G/DL (ref 13.5–17)
LYMPHOCYTE %: 32.1 %
MCH RBC QN AUTO: 29.4 PG (ref 25–33)
MCHC RBC AUTO-ENTMCNC: 33.1 G/DL (ref 31–36)
MCV RBC AUTO: 88.8 FL (ref 80–99)
MONOCYTES # BLD: 11.5 %
NEUTROPHILS RELATIVE PERCENT: 52.5 %
PDW BLD-RTO: 12.2 % (ref 11.5–15)
PLATELETS: 250 K/UL (ref 130–400)
PMV BLD AUTO: 11.1 FL (ref 9.3–13)
RBC: 4.93 M/UL (ref 4.5–6.1)
WBC: 5.7 K/UL (ref 3.5–11)

## 2022-09-14 LAB
ALBUMIN SERPL-MCNC: 4.9 G/DL (ref 3.5–5.2)
ALK PHOSPHATASE: 53 U/L (ref 40–121)
ALT SERPL-CCNC: 23 U/L (ref 5–50)
ANION GAP SERPL CALCULATED.3IONS-SCNC: 9 MEQ/L (ref 7–16)
AST SERPL-CCNC: 26 U/L (ref 9–50)
BILIRUB SERPL-MCNC: 0.7 MG/DL
BUN BLDV-MCNC: 18 MG/DL (ref 6–20)
CALCIUM SERPL-MCNC: 9.5 MG/DL (ref 8.5–10.5)
CHLORIDE BLD-SCNC: 104 MEQ/L (ref 95–107)
CHOLESTEROL/HDL RATIO: 2.3 RATIO
CHOLESTEROL: 140 MG/DL
CO2: 28 MEQ/L (ref 19–31)
CREAT SERPL-MCNC: 0.82 MG/DL (ref 0.8–1.4)
EGFR IF NONAFRICAN AMERICAN: 106 ML/MIN/1.73
GLUCOSE: 93 MG/DL (ref 70–99)
HDLC SERPL-MCNC: 60 MG/DL
LDL CHOLESTEROL CALCULATED: 70 MG/DL
LDL/HDL RATIO: 1.2 RATIO
POTASSIUM SERPL-SCNC: 4.8 MEQ/L (ref 3.5–5.4)
PSA, ULTRASENSITIVE: 1.06 NG/ML
SODIUM BLD-SCNC: 141 MEQ/L (ref 133–146)
T4 FREE: 1.32 NG/DL (ref 0.8–1.9)
TOTAL PROTEIN: 7.1 G/DL (ref 6.1–8.3)
TRIGL SERPL-MCNC: 49 MG/DL
TSH SERPL DL<=0.05 MIU/L-ACNC: 1.59 UIU/ML (ref 0.4–4.1)
VLDLC SERPL CALC-MCNC: 10 MG/DL

## 2022-09-26 ENCOUNTER — OFFICE VISIT (OUTPATIENT)
Dept: FAMILY MEDICINE CLINIC | Age: 52
End: 2022-09-26
Payer: COMMERCIAL

## 2022-09-26 VITALS
HEART RATE: 69 BPM | WEIGHT: 158.8 LBS | HEIGHT: 68 IN | OXYGEN SATURATION: 97 % | TEMPERATURE: 97.9 F | DIASTOLIC BLOOD PRESSURE: 78 MMHG | SYSTOLIC BLOOD PRESSURE: 118 MMHG | BODY MASS INDEX: 24.07 KG/M2 | RESPIRATION RATE: 12 BRPM

## 2022-09-26 DIAGNOSIS — J30.1 CHRONIC SEASONAL ALLERGIC RHINITIS DUE TO POLLEN: ICD-10-CM

## 2022-09-26 DIAGNOSIS — Z13.31 DEPRESSION SCREEN: ICD-10-CM

## 2022-09-26 DIAGNOSIS — L30.9 ECZEMA, UNSPECIFIED TYPE: ICD-10-CM

## 2022-09-26 DIAGNOSIS — Z13.9 SCREENING DUE: ICD-10-CM

## 2022-09-26 DIAGNOSIS — I10 ESSENTIAL HYPERTENSION: ICD-10-CM

## 2022-09-26 DIAGNOSIS — Z00.00 WELL ADULT EXAM: Primary | ICD-10-CM

## 2022-09-26 PROCEDURE — G0444 DEPRESSION SCREEN ANNUAL: HCPCS | Performed by: STUDENT IN AN ORGANIZED HEALTH CARE EDUCATION/TRAINING PROGRAM

## 2022-09-26 PROCEDURE — 99396 PREV VISIT EST AGE 40-64: CPT | Performed by: STUDENT IN AN ORGANIZED HEALTH CARE EDUCATION/TRAINING PROGRAM

## 2022-09-26 RX ORDER — LISINOPRIL 10 MG/1
10 TABLET ORAL DAILY
Qty: 90 TABLET | Refills: 3 | Status: SHIPPED | OUTPATIENT
Start: 2022-09-26

## 2022-09-26 SDOH — ECONOMIC STABILITY: FOOD INSECURITY: WITHIN THE PAST 12 MONTHS, YOU WORRIED THAT YOUR FOOD WOULD RUN OUT BEFORE YOU GOT MONEY TO BUY MORE.: NEVER TRUE

## 2022-09-26 SDOH — ECONOMIC STABILITY: FOOD INSECURITY: WITHIN THE PAST 12 MONTHS, THE FOOD YOU BOUGHT JUST DIDN'T LAST AND YOU DIDN'T HAVE MONEY TO GET MORE.: NEVER TRUE

## 2022-09-26 ASSESSMENT — PATIENT HEALTH QUESTIONNAIRE - PHQ9
2. FEELING DOWN, DEPRESSED OR HOPELESS: 0
SUM OF ALL RESPONSES TO PHQ9 QUESTIONS 1 & 2: 0
SUM OF ALL RESPONSES TO PHQ QUESTIONS 1-9: 0
1. LITTLE INTEREST OR PLEASURE IN DOING THINGS: 0

## 2022-09-26 ASSESSMENT — ENCOUNTER SYMPTOMS
EYES NEGATIVE: 1
RESPIRATORY NEGATIVE: 1
GASTROINTESTINAL NEGATIVE: 1

## 2022-09-26 ASSESSMENT — SOCIAL DETERMINANTS OF HEALTH (SDOH): HOW HARD IS IT FOR YOU TO PAY FOR THE VERY BASICS LIKE FOOD, HOUSING, MEDICAL CARE, AND HEATING?: NOT VERY HARD

## 2022-09-26 NOTE — PROGRESS NOTES
S: 47 y.o. male with   Chief Complaint   Patient presents with    Annual Exam       HPI: please see resident note for HPI and ROS. BP Readings from Last 3 Encounters:   09/26/22 118/78   09/23/21 120/74   04/14/21 134/81     Wt Readings from Last 3 Encounters:   09/26/22 158 lb 12.8 oz (72 kg)   09/23/21 157 lb (71.2 kg)   04/14/21 152 lb (68.9 kg)       O: VS:  height is 5' 8\" (1.727 m) and weight is 158 lb 12.8 oz (72 kg). His skin temperature is 97.9 °F (36.6 °C). His blood pressure is 118/78 and his pulse is 69. His respiration is 12 and oxygen saturation is 97%. Physical exam performed by resident physician. Diagnosis Orders   1. Well adult exam  CBC with Auto Differential    PSA Screening    TSH    T4, Free    Lipid, Fasting    PA DEPRESSION SCREEN ANNUAL      2. Essential hypertension  lisinopril (PRINIVIL;ZESTRIL) 10 MG tablet    Comprehensive Metabolic Panel    PSA Screening    TSH    T4, Free      3. Chronic seasonal allergic rhinitis due to pollen  PSA Screening      4. Eczema, unspecified type  PSA Screening      5. Depression screen  PSA Screening    PA DEPRESSION SCREEN ANNUAL      6. Screening due  PSA Screening    Lipid, Fasting    PA DEPRESSION SCREEN ANNUAL          Plan:  Please refer to resident note for full plan. 16-year-old male presents the office for yearly physical.  Overall patient is doing very well. Is a pertinent history of hypertension, seasonal allergies, eczema. Hypertension: Chronic, well controlled. Blood pressure today 118/78. Continue lisinopril 10 mg daily. Seasonal allergies: Chronic, well controlled on Claritin 10 mg daily. No concerns this time. Eczema: Chronic, well controlled without any daily medications. Depression screen: Negative pression screen. No concerns this time. PHQ-9 Total Score: 0 (9/26/2022  8:52 AM)    Patient was educated importance of getting flu vaccine on a yearly basis. We will get this done local pharmacy.     Will place orders in for blood work to be completed in the next year prior to yearly wellness exam.      Health Maintenance Due   Topic Date Due    COVID-19 Vaccine (3 - Booster for Radha series) 04/08/2022    Flu vaccine (1) 08/01/2022       Attending Physician Statement  I have discussed the case, including pertinent history and exam findings with the resident. I agree with the documented assessment and plan as documented by the resident.   MAKEDA Liu DO 9/27/2022 7:13 AM

## 2022-09-26 NOTE — PROGRESS NOTES
[address] [address] [facility] [id number]  Dept: [phone]  Dept Fax: [phone]: [phone]      Assessment & Plan   1. Well adult exam  No current concerns. Labs reviewed with patient. 2. Essential hypertension  Well controlled on lisinopril. Continue    3. Chronic seasonal allergic rhinitis due to pollen    4. Eczema, unspecified type    5. Depression screen  Screen negative. 6. Screening due  F/u labs ordered for patient to be obtained at next yearly follow up  - PSA, lipid panel, CBC, CMP, Thyroid Studies    Health Maintenance  Colonoscopy - due 2026 due to polyps found  PSA - wnl on 9/13/22    Vaccinations = Encouraged  - Covid  - Flu Vaccine  - Shingles    Labs  CBC nml 9/2022  CMP nml 9/2022  Lipids nml 9/2022    Return in about 1 year (around 9/26/2023) for need appt for at least 1 more day than a year. History of Present Illness   Reason for Appointment:   Chief Complaint   Patient presents with    Annual Exam     Jason Shirley is a 47 y.o. male who presents today for:  Annual and to discuss recent labs. PSA, CBC, TSH/T4, CMP, Lipids all wnl. HTN on lisinopril 10 mg    Allergies on claritin    Also taking daily multivitamin and omega 3. Review of Systems   Constitutional: Negative. HENT: Negative. Eyes: Negative. Respiratory: Negative. Cardiovascular: Negative. Gastrointestinal: Negative. No future appointments.       [facility]    Patient Active Problem List    Diagnosis Date Noted    Essential hypertension 05/14/2018    Chronic seasonal allergic rhinitis due to pollen 05/14/2018    Eczema 05/14/2018       [facility]        Procedure Laterality Date    BACK SURGERY Left 4/14/2021    EXCISIOBN LIPOMA LEFT ABDOMEN performed by Creta Prader, MD at [facility]    Dr. Bonnye Kocher    Prior to Admission medications    Medication Sig Start Date End Date Taking? Authorizing Provider   lisinopril (PRINIVIL;ZESTRIL) 10 MG tablet Take 1 tablet by mouth daily 9/26/22  Yes Thai Yoon MD   loratadine (CLARITIN) 10 MG tablet Take 10 mg by mouth daily   Yes Historical Provider, MD   Multiple Vitamins-Minerals (MULTIVITAMIN PO) Take by mouth daily   Yes Historical Provider, MD   Omega-3 Fatty Acids (OMEGA 3 PO) Take by mouth daily   Yes Historical Provider, MD        Allergies    Patient has no known allergies. Social    Social History     Tobacco Use    Smoking status: Never    Smokeless tobacco: Never   Substance Use Topics    Alcohol use: Yes     Comment: In the summer occasionally    Drug use: No       Objective     Vitals:    09/26/22 0853   BP: 118/78   Pulse: 69   Resp: 12   Temp: 97.9 °F (36.6 °C)   SpO2: 97%       Physical Exam:  GENERAL: Alert and oriented. No distress. EYES: No erythema or icterus. ENT: No thyromegaly or cervical lymphadenopathy. No JVD. HEART: Normal S1/S2. No murmur, rub or gallop. LUNGS: Clear to auscultation. ABDOMEN: Soft and non-tender.   EXTREMITIES: no edema  NEUROLOGICAL: Grossly normal.  SKIN: no rashes    Electronically signed by Thai Yoon MD on 9/26/2022 at 9:29 AM

## 2023-05-30 ENCOUNTER — TELEPHONE (OUTPATIENT)
Dept: FAMILY MEDICINE CLINIC | Age: 53
End: 2023-05-30

## 2023-09-23 ASSESSMENT — PATIENT HEALTH QUESTIONNAIRE - PHQ9
SUM OF ALL RESPONSES TO PHQ QUESTIONS 1-9: 0
2. FEELING DOWN, DEPRESSED OR HOPELESS: NOT AT ALL
1. LITTLE INTEREST OR PLEASURE IN DOING THINGS: 0
SUM OF ALL RESPONSES TO PHQ9 QUESTIONS 1 & 2: 0
SUM OF ALL RESPONSES TO PHQ QUESTIONS 1-9: 0
1. LITTLE INTEREST OR PLEASURE IN DOING THINGS: NOT AT ALL
SUM OF ALL RESPONSES TO PHQ QUESTIONS 1-9: 0
2. FEELING DOWN, DEPRESSED OR HOPELESS: 0
SUM OF ALL RESPONSES TO PHQ9 QUESTIONS 1 & 2: 0
SUM OF ALL RESPONSES TO PHQ QUESTIONS 1-9: 0

## 2023-09-26 ENCOUNTER — OFFICE VISIT (OUTPATIENT)
Dept: FAMILY MEDICINE CLINIC | Age: 53
End: 2023-09-26
Payer: COMMERCIAL

## 2023-09-26 VITALS
DIASTOLIC BLOOD PRESSURE: 82 MMHG | BODY MASS INDEX: 23.1 KG/M2 | WEIGHT: 152.4 LBS | OXYGEN SATURATION: 97 % | HEIGHT: 68 IN | TEMPERATURE: 97.8 F | SYSTOLIC BLOOD PRESSURE: 112 MMHG | HEART RATE: 60 BPM | RESPIRATION RATE: 14 BRPM

## 2023-09-26 DIAGNOSIS — J30.1 CHRONIC SEASONAL ALLERGIC RHINITIS DUE TO POLLEN: ICD-10-CM

## 2023-09-26 DIAGNOSIS — I10 ESSENTIAL HYPERTENSION: ICD-10-CM

## 2023-09-26 DIAGNOSIS — Z00.00 WELL ADULT EXAM: Primary | ICD-10-CM

## 2023-09-26 DIAGNOSIS — L30.9 ECZEMA, UNSPECIFIED TYPE: ICD-10-CM

## 2023-09-26 PROCEDURE — 3079F DIAST BP 80-89 MM HG: CPT | Performed by: STUDENT IN AN ORGANIZED HEALTH CARE EDUCATION/TRAINING PROGRAM

## 2023-09-26 PROCEDURE — 3074F SYST BP LT 130 MM HG: CPT | Performed by: STUDENT IN AN ORGANIZED HEALTH CARE EDUCATION/TRAINING PROGRAM

## 2023-09-26 PROCEDURE — 99396 PREV VISIT EST AGE 40-64: CPT | Performed by: STUDENT IN AN ORGANIZED HEALTH CARE EDUCATION/TRAINING PROGRAM

## 2023-09-26 RX ORDER — LISINOPRIL 10 MG/1
10 TABLET ORAL DAILY
Qty: 90 TABLET | Refills: 3 | Status: SHIPPED | OUTPATIENT
Start: 2023-09-26

## 2023-09-26 SDOH — ECONOMIC STABILITY: FOOD INSECURITY: WITHIN THE PAST 12 MONTHS, YOU WORRIED THAT YOUR FOOD WOULD RUN OUT BEFORE YOU GOT MONEY TO BUY MORE.: NEVER TRUE

## 2023-09-26 SDOH — ECONOMIC STABILITY: INCOME INSECURITY: HOW HARD IS IT FOR YOU TO PAY FOR THE VERY BASICS LIKE FOOD, HOUSING, MEDICAL CARE, AND HEATING?: NOT HARD AT ALL

## 2023-09-26 SDOH — ECONOMIC STABILITY: FOOD INSECURITY: WITHIN THE PAST 12 MONTHS, THE FOOD YOU BOUGHT JUST DIDN'T LAST AND YOU DIDN'T HAVE MONEY TO GET MORE.: NEVER TRUE

## 2023-09-26 SDOH — ECONOMIC STABILITY: HOUSING INSECURITY
IN THE LAST 12 MONTHS, WAS THERE A TIME WHEN YOU DID NOT HAVE A STEADY PLACE TO SLEEP OR SLEPT IN A SHELTER (INCLUDING NOW)?: NO

## 2023-09-26 ASSESSMENT — ENCOUNTER SYMPTOMS
SHORTNESS OF BREATH: 0
SORE THROAT: 0
COUGH: 0
ABDOMINAL PAIN: 0
RHINORRHEA: 0
EYE DISCHARGE: 0

## 2023-09-26 NOTE — PROGRESS NOTES
[address] [facility] [address] [place] [id number]  Dept: [phone]  Dept Fax: [phone] 35: [phone]  Resident Note    Assessment & Plan     1. Well adult exam    2. Essential hypertension    3. Chronic seasonal allergic rhinitis due to pollen    4. Eczema, unspecified type       Orders Placed This Encounter    lisinopril (PRINIVIL;ZESTRIL) 10 MG tablet     Sig: Take 1 tablet by mouth daily     Dispense:  90 tablet     Refill:  3       Health Maintenance  Colonoscopy - due 2026 due to polyps found  PSA - wnl on 9/23     Vaccinations = Encouraged  - Covid - deferred by patient  - Flu Vaccine pt to obtain at pharmacy. Return in about 1 year (around 9/26/2024) for chronic conditions and labs, w/ Dr. Jayne Valencia, need to be after 9/26. .  Future Appointments   Date Time Provider [facility] [address]   10/24/2024  8:00 AM Zainab Barrios MD [facility] [address]       History of Present Illness     Sarita Alexandra is a 47 y.o. male who presents today for:    Chief Complaint   Patient presents with    Annual Exam     Htn on lisinopril 10 mg daily    Allergies on claritin    Eczema - well controlled, no issues currently. Has minimal issues on fingertips in the winter but otherwise, isn't bothersome. No other complaints. Review of Systems   Constitutional:  Negative for chills and fever. HENT:  Negative for congestion, rhinorrhea and sore throat. Eyes:  Negative for discharge. Respiratory:  Negative for cough and shortness of breath. Cardiovascular:  Negative for chest pain. Gastrointestinal:  Negative for abdominal pain.         No results found for: \"LABA1C\", \"[id number]\"   No results found for: \"MALBCR\"   Lab Results   Component Value Date    CHOL 140 09/13/2022    TRIG 49 09/13/2022    HDL 60 09/13/2022    LDLCALC 70 09/13/2022    LABVLDL 10 09/13/2022    CHOLHDLRATIO 2.3 09/13/2022      Lab Results   Component

## 2024-09-19 ENCOUNTER — TELEPHONE (OUTPATIENT)
Dept: FAMILY MEDICINE CLINIC | Age: 54
End: 2024-09-19

## 2024-09-24 ENCOUNTER — TELEPHONE (OUTPATIENT)
Dept: FAMILY MEDICINE CLINIC | Age: 54
End: 2024-09-24

## 2024-09-25 ENCOUNTER — TELEPHONE (OUTPATIENT)
Dept: FAMILY MEDICINE CLINIC | Age: 54
End: 2024-09-25

## 2024-10-08 ASSESSMENT — PATIENT HEALTH QUESTIONNAIRE - PHQ9
1. LITTLE INTEREST OR PLEASURE IN DOING THINGS: NOT AT ALL
2. FEELING DOWN, DEPRESSED OR HOPELESS: NOT AT ALL
1. LITTLE INTEREST OR PLEASURE IN DOING THINGS: NOT AT ALL
SUM OF ALL RESPONSES TO PHQ QUESTIONS 1-9: 0
SUM OF ALL RESPONSES TO PHQ QUESTIONS 1-9: 0
2. FEELING DOWN, DEPRESSED OR HOPELESS: NOT AT ALL
SUM OF ALL RESPONSES TO PHQ QUESTIONS 1-9: 0
SUM OF ALL RESPONSES TO PHQ9 QUESTIONS 1 & 2: 0
SUM OF ALL RESPONSES TO PHQ QUESTIONS 1-9: 0
SUM OF ALL RESPONSES TO PHQ9 QUESTIONS 1 & 2: 0

## 2024-10-11 ENCOUNTER — OFFICE VISIT (OUTPATIENT)
Dept: FAMILY MEDICINE CLINIC | Age: 54
End: 2024-10-11

## 2024-10-11 VITALS
HEIGHT: 68 IN | TEMPERATURE: 98.1 F | BODY MASS INDEX: 23.52 KG/M2 | DIASTOLIC BLOOD PRESSURE: 86 MMHG | WEIGHT: 155.2 LBS | OXYGEN SATURATION: 99 % | RESPIRATION RATE: 18 BRPM | SYSTOLIC BLOOD PRESSURE: 124 MMHG | HEART RATE: 59 BPM

## 2024-10-11 DIAGNOSIS — I10 ESSENTIAL HYPERTENSION: ICD-10-CM

## 2024-10-11 RX ORDER — LISINOPRIL 10 MG/1
10 TABLET ORAL DAILY
Qty: 90 TABLET | Refills: 3 | Status: SHIPPED | OUTPATIENT
Start: 2024-10-11

## 2024-10-11 SDOH — ECONOMIC STABILITY: FOOD INSECURITY: WITHIN THE PAST 12 MONTHS, YOU WORRIED THAT YOUR FOOD WOULD RUN OUT BEFORE YOU GOT MONEY TO BUY MORE.: NEVER TRUE

## 2024-10-11 SDOH — ECONOMIC STABILITY: FOOD INSECURITY: WITHIN THE PAST 12 MONTHS, THE FOOD YOU BOUGHT JUST DIDN'T LAST AND YOU DIDN'T HAVE MONEY TO GET MORE.: NEVER TRUE

## 2024-10-11 SDOH — ECONOMIC STABILITY: INCOME INSECURITY: HOW HARD IS IT FOR YOU TO PAY FOR THE VERY BASICS LIKE FOOD, HOUSING, MEDICAL CARE, AND HEATING?: NOT HARD AT ALL

## 2024-10-11 ASSESSMENT — ENCOUNTER SYMPTOMS
NAUSEA: 0
SHORTNESS OF BREATH: 0
CONSTIPATION: 0
VOMITING: 0
BLOOD IN STOOL: 0
DIARRHEA: 0
ABDOMINAL PAIN: 0

## 2024-10-11 NOTE — PROGRESS NOTES
Attending Physician Note    I reviewed the patient's medical history, the resident's findings on physical examination, the patient's diagnoses, and treatment plan as documented in the resident note.  I concur with the treatment plan as documented.  Any additional suggestions noted below.    GE modifier

## 2024-10-11 NOTE — PROGRESS NOTES
[facility]  [address]  [place] [id number]  Dept: [phone]  Dept Fax: [phone]  Loc: [phone]      Mick Tabares is a 54 y.o. male who presents today for:  Chief Complaint   Patient presents with    Follow-up     Pt has no concerns or complaints.       HPI:   Mick Tabares is 54 y.o. presents today for annual physical .   Got labs done Monday, no results yet . Pt denies any complaints or concerns at this time. Declines COVID shot at this time. Dis recommend flu shot but he wants to get that done at the pharmacy.   Last colonoscopy 2021 with APRIL Thomson s/graham polyp removal, recommend repeat 2026    Objective:     Vitals:    10/11/24 0909   BP: 124/86   Pulse: 59   Resp: 18   Temp: 98.1 °F (36.7 °C)   SpO2: 99%         Wt Readings from Last 3 Encounters:   10/11/24 70.4 kg (155 lb 3.2 oz)   09/26/23 69.1 kg (152 lb 6.4 oz)   09/26/22 72 kg (158 lb 12.8 oz)       BP Readings from Last 3 Encounters:   10/11/24 124/86   09/26/23 112/82   09/26/22 118/78       Lab Results   Component Value Date    WBC 5.7 09/13/2022    HGB 14.5 09/13/2022    HCT 43.8 09/13/2022    MCV 88.8 09/13/2022     09/13/2022     Lab Results   Component Value Date     09/13/2022    K 4.8 09/13/2022     09/13/2022    CO2 28 09/13/2022    BUN 18 09/13/2022    CREATININE 0.82 09/13/2022    GLUCOSE 93 09/13/2022    CALCIUM 9.5 09/13/2022    BILITOT 0.7 09/13/2022    ALKPHOS 53 09/13/2022    AST 26 09/13/2022    ALT 23 09/13/2022     Lab Results   Component Value Date    TSH 1.590 09/13/2022     No results found for: \"LABA1C\"  No results found for: \"EAG\"  Lab Results   Component Value Date    CHOL 140 09/13/2022    CHOL 119 (L) 09/08/2021    CHOL 130 (L) 09/10/2020     Lab Results   Component Value Date    TRIG 49 09/13/2022    TRIG 40 09/08/2021    TRIG 38 09/10/2020     Lab Results   Component Value Date    HDL 60 09/13/2022    HDL 57

## (undated) DEVICE — Device

## (undated) DEVICE — GARMENT,MEDLINE,DVT,INT,CALF,MED, GEN2: Brand: MEDLINE

## (undated) DEVICE — SUTURE MCRYL SZ 4-0 L18IN ABSRB UD P-3 L13MM 3/8 CIR PRIM Y494G

## (undated) DEVICE — GOWN,SIRUS,NON REINFRCD,LARGE,SET IN SL: Brand: MEDLINE

## (undated) DEVICE — SUTURE PDS II SZ 0 L27IN ABSRB VLT L36MM CT-1 1/2 CIR Z340H

## (undated) DEVICE — BANDAGE,GAUZE,4.5"X4.1YD,STERILE,LF: Brand: MEDLINE

## (undated) DEVICE — SUTURE NONABSORBABLE BRAIDED 4-0 SH 30 IN BLK PERMA HND K831H

## (undated) DEVICE — GLOVE SURG SZ 8 L11.77IN FNGR THK9.8MIL STRW LTX POLYMER

## (undated) DEVICE — INTENDED FOR TISSUE SEPARATION, AND OTHER PROCEDURES THAT REQUIRE A SHARP SURGICAL BLADE TO PUNCTURE OR CUT.: Brand: BARD-PARKER ® CARBON RIB-BACK BLADES

## (undated) DEVICE — GLOVE ORANGE PI 7   MSG9070

## (undated) DEVICE — CHLORAPREP 26ML CLEAR

## (undated) DEVICE — COTTON BALL ST